# Patient Record
Sex: FEMALE
[De-identification: names, ages, dates, MRNs, and addresses within clinical notes are randomized per-mention and may not be internally consistent; named-entity substitution may affect disease eponyms.]

---

## 2019-09-19 ENCOUNTER — APPOINTMENT (OUTPATIENT)
Dept: ANTEPARTUM | Facility: CLINIC | Age: 44
End: 2019-09-19
Payer: COMMERCIAL

## 2019-09-19 ENCOUNTER — ASOB RESULT (OUTPATIENT)
Age: 44
End: 2019-09-19

## 2019-09-19 PROBLEM — Z00.00 ENCOUNTER FOR PREVENTIVE HEALTH EXAMINATION: Status: ACTIVE | Noted: 2019-09-19

## 2019-09-19 PROCEDURE — 76813 OB US NUCHAL MEAS 1 GEST: CPT

## 2019-09-19 PROCEDURE — 76801 OB US < 14 WKS SINGLE FETUS: CPT

## 2019-11-13 ENCOUNTER — APPOINTMENT (OUTPATIENT)
Dept: ANTEPARTUM | Facility: CLINIC | Age: 44
End: 2019-11-13
Payer: COMMERCIAL

## 2019-11-13 ENCOUNTER — ASOB RESULT (OUTPATIENT)
Age: 44
End: 2019-11-13

## 2019-11-13 PROCEDURE — 76811 OB US DETAILED SNGL FETUS: CPT

## 2024-04-18 ENCOUNTER — APPOINTMENT (OUTPATIENT)
Dept: ORTHOPEDIC SURGERY | Facility: CLINIC | Age: 49
End: 2024-04-18
Payer: COMMERCIAL

## 2024-04-18 VITALS — HEIGHT: 61 IN | WEIGHT: 110 LBS | BODY MASS INDEX: 20.77 KG/M2

## 2024-04-18 DIAGNOSIS — M54.16 RADICULOPATHY, LUMBAR REGION: ICD-10-CM

## 2024-04-18 PROCEDURE — 73564 X-RAY EXAM KNEE 4 OR MORE: CPT | Mod: LT

## 2024-04-18 PROCEDURE — 99203 OFFICE O/P NEW LOW 30 MIN: CPT

## 2024-04-18 NOTE — ASSESSMENT
[FreeTextEntry1] : 6 days symptoms have now improved left leg pain that was localized primarily around the knee.  After history and exam, it seems like the pain was likely more of radicular origin.  Knee exam was normal.  Knee x-ray is normal. - Can continue activities as tolerated - Given home exercise plan for lower extremity and back stretching - Follow-up as needed

## 2024-04-18 NOTE — PHYSICAL EXAM
[de-identified] : Constitutional: Well developed, well nourished, able to communicate Neuro: Normal sensation, No focal deficits Skin: Intact CV: Peripheral vascular exam grossly normal Pulm: No signs of respiratory distress Psych: Oriented, normal mood and affect

## 2024-04-18 NOTE — IMAGING
[de-identified] : Back: - No obvious deformity - No pain with palpation of spinous processes or paraspinal musculature - No pain with SI palpation - ROM intact throughout flexion, extension, sidebending, and rotation - 5/5 strength throughout LE evaluation bilaterally - No pain with KIKI - Negative Facet loading pain - Negative straight leg raise bilaterally.  Tight hamstrings left greater than right - Distally neurovascularly intact  L knee: - No obvious deformity or swelling - No pain with palpation of medial or lateral joint line. - ROM from 135 degrees of flexion to 0 degrees extension. - 5/5 strength with knee flexion and extension - Stable varus, valgus, Lachman's, posterior drawer testing - Negative Rae's - Negative patellar grind - Distally neurovascularly intact.     R knee: - No obvious deformity or swelling - No pain with palpation of medial or lateral joint line. - ROM from 135 degrees of flexion to 0 degrees extension. - 5/5 strength with knee flexion and extension - Stable varus, valgus, Lachman's, posterior drawer testing - Distally neurovascularly intact. [Left] : left knee [There are no fractures, subluxations or dislocations. No significant abnormalities are seen] : There are no fractures, subluxations or dislocations. No significant abnormalities are seen

## 2024-04-18 NOTE — HISTORY OF PRESENT ILLNESS
[de-identified] :  04/18/2024: Patient is a 48-year-old female presenting for evaluation of left knee pain that started on 4/12/2024.  She denies any injuries and states pain worsened after being on a bouncy house over the weekend she complains of pain and fullness sensation of the posterior aspect of the knee with occasional radiation upwards towards the hip and down towards the toes.  Initially she felt some pins and needle sensation when symptoms started.  She found pain to be worse with walking.  Problem with stairs or positional changes.  She tried Tylenol and Advil with some improvement of symptoms.  Overall, her pain has improved with stretching and she does not feel much today.  She denies any weakness